# Patient Record
Sex: MALE | Race: WHITE | Employment: OTHER | URBAN - METROPOLITAN AREA
[De-identification: names, ages, dates, MRNs, and addresses within clinical notes are randomized per-mention and may not be internally consistent; named-entity substitution may affect disease eponyms.]

---

## 2023-10-01 ENCOUNTER — APPOINTMENT (OUTPATIENT)
Dept: GENERAL RADIOLOGY | Age: 53
End: 2023-10-01
Payer: COMMERCIAL

## 2023-10-01 ENCOUNTER — HOSPITAL ENCOUNTER (EMERGENCY)
Age: 53
Discharge: HOME | End: 2023-10-01
Attending: EMERGENCY MEDICINE
Payer: COMMERCIAL

## 2023-10-01 VITALS
HEART RATE: 60 BPM | OXYGEN SATURATION: 100 % | DIASTOLIC BLOOD PRESSURE: 80 MMHG | RESPIRATION RATE: 20 BRPM | TEMPERATURE: 98.1 F | SYSTOLIC BLOOD PRESSURE: 122 MMHG | HEIGHT: 72 IN | BODY MASS INDEX: 23.83 KG/M2 | WEIGHT: 175.93 LBS

## 2023-10-01 DIAGNOSIS — S60.457A FOREIGN BODY OF LEFT LITTLE FINGER: Primary | ICD-10-CM

## 2023-10-01 PROCEDURE — 64450 NJX AA&/STRD OTHER PN/BRANCH: CPT

## 2023-10-01 PROCEDURE — 73140 X-RAY EXAM OF FINGER(S): CPT

## 2023-10-01 PROCEDURE — 99283 EMERGENCY DEPT VISIT LOW MDM: CPT

## 2023-10-01 PROCEDURE — 6360000002 HC RX W HCPCS

## 2023-10-01 RX ORDER — AMOXICILLIN AND CLAVULANATE POTASSIUM 875; 125 MG/1; MG/1
1 TABLET, FILM COATED ORAL 2 TIMES DAILY
Qty: 20 TABLET | Refills: 0 | Status: SHIPPED | OUTPATIENT
Start: 2023-10-01 | End: 2023-10-11

## 2023-10-01 RX ORDER — BUPIVACAINE HYDROCHLORIDE 5 MG/ML
30 INJECTION, SOLUTION EPIDURAL; INTRACAUDAL ONCE
Status: DISCONTINUED | OUTPATIENT
Start: 2023-10-01 | End: 2023-10-01

## 2023-10-01 RX ORDER — BUPIVACAINE HYDROCHLORIDE 2.5 MG/ML
30 INJECTION, SOLUTION EPIDURAL; INFILTRATION; INTRACAUDAL ONCE
Status: COMPLETED | OUTPATIENT
Start: 2023-10-01 | End: 2023-10-01

## 2023-10-01 RX ORDER — BUPIVACAINE HYDROCHLORIDE 2.5 MG/ML
INJECTION, SOLUTION EPIDURAL; INFILTRATION; INTRACAUDAL
Status: COMPLETED
Start: 2023-10-01 | End: 2023-10-01

## 2023-10-01 RX ADMIN — BUPIVACAINE HYDROCHLORIDE 75 MG: 2.5 INJECTION, SOLUTION EPIDURAL; INFILTRATION; INTRACAUDAL at 09:25

## 2023-10-01 ASSESSMENT — PAIN - FUNCTIONAL ASSESSMENT
PAIN_FUNCTIONAL_ASSESSMENT: 0-10
PAIN_FUNCTIONAL_ASSESSMENT: 0-10

## 2023-10-01 ASSESSMENT — PATIENT HEALTH QUESTIONNAIRE - PHQ9
SUM OF ALL RESPONSES TO PHQ QUESTIONS 1-9: 0
2. FEELING DOWN, DEPRESSED OR HOPELESS: 0
SUM OF ALL RESPONSES TO PHQ9 QUESTIONS 1 & 2: 0
SUM OF ALL RESPONSES TO PHQ QUESTIONS 1-9: 0
1. LITTLE INTEREST OR PLEASURE IN DOING THINGS: 0

## 2023-10-01 ASSESSMENT — PAIN SCALES - GENERAL
PAINLEVEL_OUTOF10: 0
PAINLEVEL_OUTOF10: 0

## 2023-10-01 NOTE — ED TRIAGE NOTES
Patient came to ER a couple months ago was taking a bluegill off of hook and fin broke off in the finger. Patient stated it is now starting to hurt when pressing on finger.

## 2023-10-01 NOTE — ED NOTES
Discharge instructions reviewed. Patient verbalized understanding. Patient will follow up with Dr. Mars Shell. Prescription sent to pharmacy.        Devora Flores RN  10/01/23 1011

## 2023-10-01 NOTE — ED PROVIDER NOTES
protocol:     Procedure explained and questions answered to patient or proxy's satisfaction: yes      Relevant documents present and verified: yes      Test results available: yes      Imaging studies available: yes      Required blood products, implants, devices, and special equipment available: yes      Site/side marked: yes      Immediately prior to procedure, a time out was called: yes      Patient identity confirmed:  Verbally with patient, arm band and provided demographic data  Location:     Location:  Finger    Finger location:  L little finger    Tendon involvement:  None  Pre-procedure details:     Imaging:  X-ray    Neurovascular status: intact    Anesthesia:     Anesthesia method:  Nerve block    Block needle gauge:  30 G    Block anesthetic:  Bupivacaine 0.25% w/o epi and lidocaine 1% w/o epi    Block technique:  Standard digital block technique    Block injection procedure:  Anatomic landmarks identified, anatomic landmarks palpated, introduced needle, negative aspiration for blood and incremental injection    Block outcome:  Anesthesia achieved  Procedure type:     Procedure complexity:  Simple  Procedure details:     Dissection of underlying tissues: no      Bloodless field: no      Foreign bodies recovered:  None    Intact foreign body removal: no    Post-procedure details:     Neurovascular status: intact      Skin closure:  None    Dressing:  Adhesive bandage    Procedure completion:  Tolerated well, no immediate complications  Comments:      I excised the callus/granulomatous material from the surface. I was hoping that with pulling that back that the foreign body would come with it but it did not. I debrided the tissues somewhat but did not dissect the finger. With palpation with a sterile glove I just do not feel the foreign body. I did not feel comfortable dissecting the finger to find it and referred the patient instead to hand surgery.   For now he is placed on Augmentin and frequent warm

## 2023-10-01 NOTE — ED NOTES
Patient has fin in right little finger. Patient has been using compound W on little finger to help get FB out. Patient finger is white and hard on top. Patient has pain in little finger when pressing on area.        Luma Garcia RN  10/01/23 [id number] [id number], RN  10/01/23 5045

## 2023-10-02 ENCOUNTER — TELEPHONE (OUTPATIENT)
Dept: ORTHOPEDIC SURGERY | Age: 53
End: 2023-10-02

## 2023-10-02 NOTE — TELEPHONE ENCOUNTER
Appointment Request     Patient requesting earlier appointment: Yes - ER THIS WEEKEND \[facility] [place] [facility]\""  Appointment offered to patient: TRIAGE  Patient Contact Number: [phone]    ER WAS NOT ABLE TO GET GET THE FIN OUT OF FINGER. PLEASE CALL TO MAKE APPT.

## 2023-10-02 NOTE — TELEPHONE ENCOUNTER
Spoke with the patient. I let him know that Dr. Ubaldo Mckoy will be out of town and referred him to [facility] to have fin removed. Patient understood and will give them a call.